# Patient Record
Sex: FEMALE | Race: WHITE | URBAN - NONMETROPOLITAN AREA
[De-identification: names, ages, dates, MRNs, and addresses within clinical notes are randomized per-mention and may not be internally consistent; named-entity substitution may affect disease eponyms.]

---

## 2021-09-08 DIAGNOSIS — L90.0 LICHEN SCLEROSUS: ICD-10-CM

## 2021-09-08 DIAGNOSIS — L30.9 ACUTE DERMATITIS: ICD-10-CM

## 2021-09-08 DIAGNOSIS — E78.2 MIXED HYPERLIPIDEMIA: ICD-10-CM

## 2021-09-08 DIAGNOSIS — K76.9 LIVER LESION: ICD-10-CM

## 2021-09-08 DIAGNOSIS — Z99.89 OSA ON CPAP: ICD-10-CM

## 2021-09-08 DIAGNOSIS — I95.1 ORTHOSTATIC HYPOTENSION: ICD-10-CM

## 2021-09-08 DIAGNOSIS — E03.8 OTHER SPECIFIED HYPOTHYROIDISM: ICD-10-CM

## 2021-09-08 DIAGNOSIS — D50.8 OTHER IRON DEFICIENCY ANEMIA: ICD-10-CM

## 2021-09-08 DIAGNOSIS — I48.20 CHRONIC A-FIB (HCC): ICD-10-CM

## 2021-09-08 DIAGNOSIS — R39.15 URINARY URGENCY: ICD-10-CM

## 2021-09-08 DIAGNOSIS — G47.33 OSA ON CPAP: ICD-10-CM

## 2021-09-08 DIAGNOSIS — F32.89 OTHER DEPRESSION: ICD-10-CM

## 2021-09-08 DIAGNOSIS — M54.2 CHRONIC NECK PAIN: ICD-10-CM

## 2021-09-08 DIAGNOSIS — K59.09 CHRONIC CONSTIPATION: ICD-10-CM

## 2021-09-08 DIAGNOSIS — R35.1 NOCTURIA: ICD-10-CM

## 2021-09-08 DIAGNOSIS — E78.00 HYPERCHOLESTEROLEMIA: ICD-10-CM

## 2021-09-08 DIAGNOSIS — R35.0 URINARY FREQUENCY: ICD-10-CM

## 2021-09-08 DIAGNOSIS — N39.41 URGE INCONTINENCE OF URINE: ICD-10-CM

## 2021-09-08 DIAGNOSIS — E55.9 VITAMIN D DEFICIENCY: ICD-10-CM

## 2021-09-08 DIAGNOSIS — I50.9 OTHER CONGESTIVE HEART FAILURE (HCC): ICD-10-CM

## 2021-09-08 DIAGNOSIS — G89.29 CHRONIC NECK PAIN: ICD-10-CM

## 2021-09-08 DIAGNOSIS — I10 ESSENTIAL HYPERTENSION: ICD-10-CM

## 2021-09-21 VITALS
DIASTOLIC BLOOD PRESSURE: 68 MMHG | BODY MASS INDEX: 25.42 KG/M2 | SYSTOLIC BLOOD PRESSURE: 130 MMHG | WEIGHT: 152.56 LBS | HEART RATE: 52 BPM | HEIGHT: 65 IN | OXYGEN SATURATION: 97 % | TEMPERATURE: 97.7 F

## 2021-09-21 PROBLEM — Z78.0 POSTMENOPAUSE: Status: ACTIVE | Noted: 2021-09-21

## 2021-09-21 PROBLEM — N18.30 STAGE 3 CHRONIC KIDNEY DISEASE (HCC): Status: ACTIVE | Noted: 2021-09-21

## 2021-09-21 PROBLEM — I82.409 DEEP VEIN THROMBOSIS (DVT) (HCC): Status: ACTIVE | Noted: 2021-09-21

## 2021-09-21 RX ORDER — DULOXETIN HYDROCHLORIDE 60 MG/1
60 CAPSULE, DELAYED RELEASE ORAL
COMMUNITY
Start: 2021-05-06

## 2021-09-21 RX ORDER — ALBUTEROL SULFATE 90 UG/1
180 AEROSOL, METERED RESPIRATORY (INHALATION) 4 TIMES DAILY
COMMUNITY
Start: 2021-04-06

## 2021-09-21 RX ORDER — LANOLIN ALCOHOL/MO/W.PET/CERES
1000 CREAM (GRAM) TOPICAL DAILY
COMMUNITY
Start: 2020-11-06

## 2021-09-21 RX ORDER — LEVOTHYROXINE SODIUM 137 UG/1
137 TABLET ORAL DAILY
COMMUNITY
Start: 2021-07-08

## 2021-09-21 RX ORDER — GABAPENTIN 300 MG/1
300 CAPSULE ORAL NIGHTLY
COMMUNITY

## 2021-09-21 RX ORDER — FERROUS SULFATE 325(65) MG
325 TABLET ORAL 2 TIMES DAILY
COMMUNITY

## 2021-09-21 RX ORDER — ATORVASTATIN CALCIUM 40 MG/1
40 TABLET, FILM COATED ORAL
COMMUNITY
Start: 2021-06-11

## 2021-09-21 RX ORDER — LORATADINE 10 MG/1
10 TABLET ORAL DAILY
COMMUNITY
Start: 2021-05-10

## 2021-09-21 RX ORDER — METOPROLOL SUCCINATE 50 MG/1
50 TABLET, EXTENDED RELEASE ORAL DAILY
COMMUNITY
Start: 2021-08-30

## 2021-09-21 RX ORDER — MULTIVIT-MIN/IRON/FOLIC ACID/K 18-600-40
500 CAPSULE ORAL DAILY
COMMUNITY
Start: 2020-11-06

## 2021-10-07 ENCOUNTER — HOSPITAL ENCOUNTER (OUTPATIENT)
Age: 71
Setting detail: SPECIMEN
Discharge: HOME | End: 2021-10-07
Payer: COMMERCIAL

## 2021-10-07 ENCOUNTER — OFFICE VISIT (OUTPATIENT)
Dept: UROLOGY | Age: 71
End: 2021-10-07
Payer: COMMERCIAL

## 2021-10-07 VITALS
OXYGEN SATURATION: 95 % | DIASTOLIC BLOOD PRESSURE: 80 MMHG | HEART RATE: 59 BPM | SYSTOLIC BLOOD PRESSURE: 122 MMHG | HEIGHT: 66 IN | BODY MASS INDEX: 24.59 KG/M2 | WEIGHT: 153 LBS

## 2021-10-07 DIAGNOSIS — K59.09 CHRONIC CONSTIPATION: ICD-10-CM

## 2021-10-07 DIAGNOSIS — N39.46 MIXED INCONTINENCE: Primary | ICD-10-CM

## 2021-10-07 DIAGNOSIS — N39.46 MIXED INCONTINENCE: ICD-10-CM

## 2021-10-07 LAB
-: ABNORMAL
AMORPHOUS: ABNORMAL
BACTERIA: ABNORMAL
BILIRUBIN URINE: NEGATIVE
CASTS UA: ABNORMAL /LPF
COLOR: ABNORMAL
COMMENT UA: ABNORMAL
CRYSTALS, UA: ABNORMAL /HPF
EPITHELIAL CELLS UA: ABNORMAL /HPF
GLUCOSE URINE: NEGATIVE
KETONES, URINE: NEGATIVE
LEUKOCYTE ESTERASE, URINE: NEGATIVE
MUCUS: ABNORMAL
NITRITE, URINE: NEGATIVE
OTHER OBSERVATIONS UA: ABNORMAL
PH UA: 7 (ref 5–8)
PROTEIN UA: ABNORMAL
RBC UA: ABNORMAL /HPF (ref 0–2)
RENAL EPITHELIAL, UA: ABNORMAL /HPF
SPECIFIC GRAVITY UA: 1 (ref 1–1.03)
TRICHOMONAS: ABNORMAL
TURBIDITY: CLEAR
URINE HGB: ABNORMAL
UROBILINOGEN, URINE: NORMAL
WBC UA: ABNORMAL /HPF
YEAST: ABNORMAL

## 2021-10-07 PROCEDURE — 99204 OFFICE O/P NEW MOD 45 MIN: CPT | Performed by: PHYSICIAN ASSISTANT

## 2021-10-07 PROCEDURE — 81001 URINALYSIS AUTO W/SCOPE: CPT

## 2021-10-07 PROCEDURE — 51798 US URINE CAPACITY MEASURE: CPT | Performed by: PHYSICIAN ASSISTANT

## 2021-10-07 PROCEDURE — 87086 URINE CULTURE/COLONY COUNT: CPT

## 2021-10-07 ASSESSMENT — ENCOUNTER SYMPTOMS
APNEA: 0
CONSTIPATION: 1
VOMITING: 0
COUGH: 0
COLOR CHANGE: 0
EYE REDNESS: 0
BACK PAIN: 0
NAUSEA: 0
WHEEZING: 0
ABDOMINAL PAIN: 0
SHORTNESS OF BREATH: 0

## 2021-10-07 NOTE — PATIENT INSTRUCTIONS
FOR CONSTIPATION    It is very important to have regular, soft, daily bowel movements, because it WILL improve your urinary symptoms. Take Miralax (or generic equivalent) 17g once daily (one capful). Take every day, DO NOT skip days, as it must be taken daily in order to be effective. DO NOT take just \"as needed\". It is safe to take long term and is recommended for your symptoms. If one dose daily causes loose stools/diarrhea, decrease to 1/2 capful or 1/4 capful. If you cannot tolerate this medication, please notify our office. If one dose daily of Miralax is not sufficient to produce a soft, easy to pass daily stool, you may also add an over-the-counter stool softener capsule. For example: colace (docusate). For Miralax to have maximal effectiveness, be sure to increase your water intake - aim for 80 oz daily unless you are on a fluid-restriction from another provider. BLADDER IRRITANTS     There are several changes you can make to your diet to help improve your urinary symptoms. The following have been shown to cause irritation to the bladder and should be AVOIDED if possible:  ~ Coffee (including decaffeinated)   ~ ALL Tea (including green teas and decaffeinated)  ~ Soda/Pop/carbonated beverages/Energy drinks (especially dark dyed sidney, root beers, mountain dew, etc)  ~These are MUCH worse if they have caffeine, but can also be irritative to the bladder even without caffeine  ~ Alcoholic beverages  ~ Spicy foods (peppers contain capsaicin, which is very irritating to the bladder)  ~ Acidic foods (for example: tomato based foods, orange juice, etc)    We encourage increased water intake, unless you have been placed on a fluid restriction by another provider. SURVEY:    You may be receiving a survey from [employer] regarding your visit today. Please complete the survey to enable us to provide the highest quality of care to you and your family.     If you cannot score us a very good on any question, please call the office to discuss how we could have made your experience a very good one. Thank you.

## 2021-10-07 NOTE — PROGRESS NOTES
HPI:    Patient is a 65 y.o. female in no acute distress. She is alert and oriented to person, place, and time. Patient presents today as a referral from DHIR[facility] Caruso for several years of incontinence, but started wearing a pad 6-7 months ago and has significantly worsened of the past 2-3 months. No history of UTIs. NO history of kidney stones. 4-5 pads a day often saturated. She states that some of her incontinence is secondary to not getting there in time. She also has incontinence when she was from sitting to standing. Nocturia 5-6. Urinates every 1 hour during the day. Drinks 2-3 decaffinated coffees per day - 1 cup in the morning and 1-2 in the afternoon, flavored logan ~32oz, and daily Decaffinated tea and/or [employer] Rootbeer (16oz). She takes fiber daily. She has a BM most days, but often they are hard. She has 5 children, all vaginal births. Hysterectomy over 20 years ago. Patient voided - 300 mL, PVR - 37mL. She was on myrbetriq in the past for about a month, but developed HTN, so it was discontinued.      Past Medical History:   Diagnosis Date    Acute dermatitis     Chronic a-fib (HCC)     Chronic constipation     Chronic neck pain     Essential hypertension     History of colon polyps     Hypercholesterolemia     Iron deficiency anemia     Lichen sclerosus     Liver lesion     Mixed hyperlipidemia     Nocturia     Orthostatic hypotension     SANDRA on CPAP     Osteopenia, unspecified location     Other congestive heart failure (HCC)     Other depression     Other specified hypothyroidism     Urinary frequency     Urinary incontinence     Urinary urgency     Vitamin D deficiency      Past Surgical History:   Procedure Laterality Date    COLONOSCOPY      HYSTERECTOMY, TOTAL ABDOMINAL      INTRACAPSULAR CATARACT EXTRACTION  11/09/2020    INTRACAPSULAR CATARACT EXTRACTION  09/28/2020    TOTAL KNEE ARTHROPLASTY Right 07/13/2015    UPPER GASTROINTESTINAL ENDOSCOPY      VARICOSE VEIN SURGERY       Outpatient Encounter Medications as of 10/7/2021   Medication Sig Dispense Refill    atorvastatin (LIPITOR) 40 MG tablet Take 40 mg by mouth      calcium carbonate 1500 (600 Ca) MG TABS tablet Take by mouth      DULoxetine (CYMBALTA) 60 MG extended release capsule Take 60 mg by mouth      levothyroxine (SYNTHROID) 137 MCG tablet Take 137 mcg by mouth daily      loratadine (CLARITIN) 10 MG tablet Take 10 mg by mouth daily      metoprolol succinate (TOPROL XL) 50 MG extended release tablet Take 50 mg by mouth daily      mirabegron (MYRBETRIQ) 25 MG TB24 Take 25 mg by mouth daily      Ascorbic Acid (VITAMIN C) 500 MG CAPS Take 500 mg by mouth daily      vitamin B-12 (CYANOCOBALAMIN) 1000 MCG tablet Take 1,000 mcg by mouth daily      Psyllium (METAMUCIL PO) Take 1,050 mg by mouth daily Take 2 525 mg capsules daily      albuterol sulfate HFA (PROAIR HFA) 108 (90 Base) MCG/ACT inhaler Inhale 180 mcg into the lungs 4 times daily      apixaban (ELIQUIS) 5 MG TABS tablet Take 5 mg by mouth 2 times daily      ferrous sulfate (IRON 325) 325 (65 Fe) MG tablet Take 325 mg by mouth 2 times daily      Fluticasone Propionate (FLONASE NA) 0.05 mg by Nasal route as needed (allergies) 1 spray each nasal as needed for allergies      gabapentin (NEURONTIN) 300 MG capsule Take 300 mg by mouth nightly.  Cholecalciferol (VITAMIN D3 PO) Take 500 Units by mouth daily       No facility-administered encounter medications on file as of 10/7/2021.       Current Outpatient Medications on File Prior to Visit   Medication Sig Dispense Refill    atorvastatin (LIPITOR) 40 MG tablet Take 40 mg by mouth      calcium carbonate 1500 (600 Ca) MG TABS tablet Take by mouth      DULoxetine (CYMBALTA) 60 MG extended release capsule Take 60 mg by mouth      levothyroxine (SYNTHROID) 137 MCG tablet Take 137 mcg by mouth daily      loratadine (CLARITIN) 10 MG tablet Take 10 mg by mouth daily      metoprolol succinate (TOPROL XL) 50 MG extended release tablet Take 50 mg by mouth daily      mirabegron (MYRBETRIQ) 25 MG TB24 Take 25 mg by mouth daily      Ascorbic Acid (VITAMIN C) 500 MG CAPS Take 500 mg by mouth daily      vitamin B-12 (CYANOCOBALAMIN) 1000 MCG tablet Take 1,000 mcg by mouth daily      Psyllium (METAMUCIL PO) Take 1,050 mg by mouth daily Take 2 525 mg capsules daily      albuterol sulfate HFA (PROAIR HFA) 108 (90 Base) MCG/ACT inhaler Inhale 180 mcg into the lungs 4 times daily      apixaban (ELIQUIS) 5 MG TABS tablet Take 5 mg by mouth 2 times daily      ferrous sulfate (IRON 325) 325 (65 Fe) MG tablet Take 325 mg by mouth 2 times daily      Fluticasone Propionate (FLONASE NA) 0.05 mg by Nasal route as needed (allergies) 1 spray each nasal as needed for allergies      gabapentin (NEURONTIN) 300 MG capsule Take 300 mg by mouth nightly.  Cholecalciferol (VITAMIN D3 PO) Take 500 Units by mouth daily       No current facility-administered medications on file prior to visit. Patient has no known allergies. Family History   Problem Relation Age of Onset   Elisha Iraheta Cancer Mother     Dementia Mother     Diabetes Father     Hypertension Brother     Heart Failure Brother     COPD Brother      Social History     Tobacco Use   Smoking Status Never Smoker   Smokeless Tobacco Never Used       Social History     Substance and Sexual Activity   Alcohol Use Never       Review of Systems   Constitutional: Negative for appetite change, chills and fever. Eyes: Negative for redness and visual disturbance. Respiratory: Negative for apnea, cough, shortness of breath and wheezing. Cardiovascular: Negative for chest pain and leg swelling. Gastrointestinal: Positive for constipation. Negative for abdominal pain, nausea and vomiting. Genitourinary: Positive for urgency.  Negative for difficulty urinating, dyspareunia, dysuria, enuresis, flank pain, frequency, hematuria, pelvic pain, vaginal bleeding and vaginal discharge. Positive for mixed incontinence   Musculoskeletal: Negative for back pain, joint swelling and myalgias. Skin: Negative for color change, rash and wound. Neurological: Negative for dizziness, tremors and numbness. Hematological: Negative for adenopathy. Does not bruise/bleed easily. Psychiatric/Behavioral: Negative for sleep disturbance. /80 (Site: Right Upper Arm, Position: Sitting, Cuff Size: Medium Adult)   Pulse 59   Ht 5' 6\" (1.676 m)   Wt 153 lb (69.4 kg)   SpO2 95%   BMI 24.69 kg/m²       PHYSICAL EXAM:  Constitutional: Patient resting comfortably, in no acute distress. Neuro: Alert and oriented to person place and time. Psych: Mood and affect normal.  HEENT: normocephalic, atraumatic  Lungs: Respiratory effort normal, unlabored  Abdomen: Soft, non-tender, non-distended  : No CVA tenderness. Pelvic: deferred     No results found for: BUN  No results found for: CREATININE    ASSESSMENT:   Diagnosis Orders   1. Mixed incontinence  SD MEASUREMENT,POST-VOID RESIDUAL VOLUME BY US,NON-IMAGING    Urinalysis with Microscopic    Culture, Urine   2. Chronic constipation         PLAN:  We will check a urinalysis and culture. We will call with results. Discussed bladder irritants thoroughly. Patient instructed to avoid/minimize intake of food/drinks such as: coffee, tea, caffeine, alcohol, carbonated beverages, dark soda/pop, spicy/acidic foods. Was sent home with a extensive list, including non-irritating alternatives. Patient does have chronic constipation. Patient is only taking fiber for this and is not having a good soft daily bowel movement daily. We did discuss with her the importance of a good soft daily bowel met and its relation to her urinary symptoms. We do recommend her starting MiraLAX daily. We did give her instructions on how to use this.     We did discuss with her that treating overactive bladder and mixed incontinence may take several different treatments in order to find the one that works best for her. We did discuss with her that there are other medications that we can try as well as Botox or InterStim device if she has failed conservative management. She may also be a good candidate for pelvic floor rehabilitation.     Follow-up in 2 to 3 months with PVR

## 2021-10-08 LAB
CULTURE: NO GROWTH
Lab: NORMAL
SPECIMEN DESCRIPTION: NORMAL

## 2021-10-11 ENCOUNTER — TELEPHONE (OUTPATIENT)
Dept: UROLOGY | Age: 71
End: 2021-10-11

## 2021-10-11 NOTE — TELEPHONE ENCOUNTER
----- Message from Michelle Morris PA-C sent at 10/11/2021 12:28 PM EDT -----  Please call pt - urine culture reviewed and does not show UTI

## 2023-12-02 DIAGNOSIS — I48.11 LONGSTANDING PERSISTENT ATRIAL FIBRILLATION (MULTI): Primary | ICD-10-CM

## 2023-12-05 RX ORDER — WARFARIN 4 MG/1
4 TABLET ORAL
Qty: 30 TABLET | Refills: 0 | Status: SHIPPED | OUTPATIENT
Start: 2023-12-05

## 2024-01-11 PROBLEM — I27.20 PULMONARY HYPERTENSION (MULTI): Status: ACTIVE | Noted: 2024-01-11

## 2024-01-11 PROBLEM — I10 ESSENTIAL HYPERTENSION: Status: ACTIVE | Noted: 2024-01-11

## 2024-01-11 PROBLEM — G47.30 SLEEP APNEA: Status: ACTIVE | Noted: 2024-01-11

## 2024-01-11 PROBLEM — I48.20 CHRONIC ATRIAL FIBRILLATION (MULTI): Status: ACTIVE | Noted: 2024-01-11

## 2024-01-11 PROBLEM — E78.5 HYPERLIPIDEMIA: Status: ACTIVE | Noted: 2024-01-11

## 2024-01-11 PROBLEM — E11.9 DIABETES MELLITUS (MULTI): Status: ACTIVE | Noted: 2024-01-11

## 2024-01-11 PROBLEM — N18.32 STAGE 3B CHRONIC KIDNEY DISEASE (MULTI): Status: ACTIVE | Noted: 2024-01-11

## 2024-01-11 PROBLEM — R55 SYNCOPE: Status: ACTIVE | Noted: 2024-01-11

## 2024-01-11 PROBLEM — E03.9 HYPOTHYROIDISM: Status: ACTIVE | Noted: 2024-01-11

## 2024-01-11 PROBLEM — I50.810 RIGHT HEART FAILURE (MULTI): Status: ACTIVE | Noted: 2024-01-11

## 2024-01-11 RX ORDER — CYCLOSPORINE 0.5 MG/ML
EMULSION OPHTHALMIC 2 TIMES DAILY
COMMUNITY

## 2024-01-11 RX ORDER — MELOXICAM 15 MG/1
15 TABLET ORAL DAILY
COMMUNITY
Start: 2023-01-26 | End: 2024-02-07

## 2024-01-11 RX ORDER — FUROSEMIDE 40 MG/1
TABLET ORAL
COMMUNITY

## 2024-01-11 RX ORDER — BIOTIN 5 MG
1 TABLET ORAL DAILY
COMMUNITY

## 2024-01-11 RX ORDER — ATORVASTATIN CALCIUM 40 MG/1
40 TABLET, FILM COATED ORAL NIGHTLY
COMMUNITY

## 2024-01-11 RX ORDER — LEVOTHYROXINE SODIUM 137 UG/1
137 TABLET ORAL DAILY
COMMUNITY

## 2024-01-11 RX ORDER — FLUTICASONE PROPIONATE 50 MCG
1 SPRAY, SUSPENSION (ML) NASAL 2 TIMES DAILY
COMMUNITY

## 2024-01-11 RX ORDER — GLUCOSAM/CHONDRO/HERB 149/HYAL 750-100 MG
1 TABLET ORAL DAILY
COMMUNITY

## 2024-01-11 RX ORDER — CHOLECALCIFEROL (VITAMIN D3) 125 MCG
1 CAPSULE ORAL DAILY
COMMUNITY

## 2024-01-11 RX ORDER — CARBOXYMETHYLCELLULOSE SODIUM 5 MG/ML
SOLUTION/ DROPS OPHTHALMIC
COMMUNITY

## 2024-01-11 RX ORDER — ALBUTEROL SULFATE 90 UG/1
2 AEROSOL, METERED RESPIRATORY (INHALATION) EVERY 4 HOURS PRN
COMMUNITY

## 2024-01-11 RX ORDER — DULOXETIN HYDROCHLORIDE 60 MG/1
60 CAPSULE, DELAYED RELEASE ORAL 2 TIMES DAILY
COMMUNITY
End: 2024-02-07

## 2024-01-11 RX ORDER — FOLIC ACID 0.4 MG
1 TABLET ORAL DAILY
COMMUNITY

## 2024-01-11 RX ORDER — FERROUS SULFATE 325(65) MG
1 TABLET ORAL DAILY
COMMUNITY

## 2024-01-11 RX ORDER — GABAPENTIN 300 MG/1
300 CAPSULE ORAL NIGHTLY
COMMUNITY

## 2024-01-11 RX ORDER — IBUPROFEN 100 MG/5ML
1 SUSPENSION, ORAL (FINAL DOSE FORM) ORAL DAILY
COMMUNITY

## 2024-01-11 RX ORDER — METOPROLOL SUCCINATE 50 MG/1
1 TABLET, EXTENDED RELEASE ORAL DAILY
COMMUNITY
Start: 2022-01-03 | End: 2024-02-07 | Stop reason: DRUGHIGH

## 2024-02-07 ENCOUNTER — OFFICE VISIT (OUTPATIENT)
Dept: CARDIOLOGY | Facility: CLINIC | Age: 74
End: 2024-02-07
Payer: COMMERCIAL

## 2024-02-07 VITALS
SYSTOLIC BLOOD PRESSURE: 118 MMHG | BODY MASS INDEX: 25.66 KG/M2 | DIASTOLIC BLOOD PRESSURE: 62 MMHG | HEART RATE: 60 BPM | WEIGHT: 154 LBS | HEIGHT: 65 IN

## 2024-02-07 DIAGNOSIS — E78.2 MIXED HYPERLIPIDEMIA: ICD-10-CM

## 2024-02-07 DIAGNOSIS — G47.33 OBSTRUCTIVE SLEEP APNEA SYNDROME: ICD-10-CM

## 2024-02-07 DIAGNOSIS — R55 SYNCOPE, UNSPECIFIED SYNCOPE TYPE: ICD-10-CM

## 2024-02-07 DIAGNOSIS — I27.20 PULMONARY HYPERTENSION (MULTI): ICD-10-CM

## 2024-02-07 DIAGNOSIS — I48.20 CHRONIC ATRIAL FIBRILLATION (MULTI): Primary | ICD-10-CM

## 2024-02-07 DIAGNOSIS — I10 ESSENTIAL HYPERTENSION: ICD-10-CM

## 2024-02-07 DIAGNOSIS — N18.32 STAGE 3B CHRONIC KIDNEY DISEASE (MULTI): ICD-10-CM

## 2024-02-07 PROCEDURE — 93000 ELECTROCARDIOGRAM COMPLETE: CPT | Performed by: INTERNAL MEDICINE

## 2024-02-07 PROCEDURE — 1036F TOBACCO NON-USER: CPT | Performed by: INTERNAL MEDICINE

## 2024-02-07 PROCEDURE — 1160F RVW MEDS BY RX/DR IN RCRD: CPT | Performed by: INTERNAL MEDICINE

## 2024-02-07 PROCEDURE — 3074F SYST BP LT 130 MM HG: CPT | Performed by: INTERNAL MEDICINE

## 2024-02-07 PROCEDURE — 99214 OFFICE O/P EST MOD 30 MIN: CPT | Performed by: INTERNAL MEDICINE

## 2024-02-07 PROCEDURE — 3078F DIAST BP <80 MM HG: CPT | Performed by: INTERNAL MEDICINE

## 2024-02-07 PROCEDURE — 1159F MED LIST DOCD IN RCRD: CPT | Performed by: INTERNAL MEDICINE

## 2024-02-07 RX ORDER — METOPROLOL SUCCINATE 25 MG/1
25 TABLET, EXTENDED RELEASE ORAL DAILY
COMMUNITY

## 2024-02-07 RX ORDER — ONDANSETRON 4 MG/1
4 TABLET, FILM COATED ORAL EVERY 8 HOURS PRN
COMMUNITY

## 2024-02-07 RX ORDER — VENLAFAXINE 75 MG/1
75 TABLET ORAL 2 TIMES DAILY
COMMUNITY

## 2024-02-07 NOTE — LETTER
February 7, 2024     Merced Huerta, APRN-CNP  [address]  [place] [address]    Patient: Rosie Ruby   YOB: 1950   Date of Visit: 2/7/2024       Dear Dr. Merced Huerta, APRN-CNP:    Thank you for referring Rosie Ruby to me for evaluation. Below are my notes for this consultation.  If you have questions, please do not hesitate to call me. I look forward to following your patient along with you.       Sincerely,     Kelley Coates MD      CC: No Recipients  ______________________________________________________________________________________    Subjective   Rosie Ruby is a 73 y.o. female       Chief Complaint    Follow-up          HPI          Patient is here for follow-up continue management for chronic permanent atrial fibrillation, long-term anticoagulation, hypertension and previous history of syncope.  Since last time I saw her she reports she developed cholecystitis and underwent cholecystectomy.  Her surgery apparently was complicated by changes in her heart rate and blood pressure requiring prolonged hospitalization.  But ultimately she was discharged on reduced dose of metoprolol and has done well since then.  She denies complaint of chest pain, palpitation, lightheadedness, dizziness or syncope.  Her EKG showed atrial fibrillation with controlled rate.    Assessment     1. Chronic atrial fibrillation. Heart rate controlled and on long-term anticoagulation   2. Prior evaluation for 2 episodes of loss of consciousness. Work-up was negative including event monitor.  She had no recurrence  3. Long-term anticoagulation recently requested switch to Coumadin. She was referred to Coumadin clinic. Risk, benefit and alternative Coumadin therapy discussed with her she understood and agreed  4. History of morbid obesity with massive weight loss intentionally with BMI dropping from 43 to around 25  5. Remote history of gastritis and gastrointestinal bleed. No  "recurrence.  6. Hypertension, controlled  7. Previous history of right-sided heart failure appears to be compensated  8. Mild chronic kidney disease. Creatinine is 1 and BUN is 25  9. Secondary pulmonary hypertension documented remotely no recent evaluation  10. Hyperlipidemia controlled. Lipid done back in May showed LDL of 62        Plan     1. Continue with strategy of heart rate control on long-term anticoagulation and event monitor showed adequately controlled heart rate  2. I reviewed with the patient her recent lab work  3.. Patient will have follow-up in 9 months with   4., Benefits alternative anticoagulation reviewed with patient at length she understood and agreed      Review of Systems   All other systems reviewed and are negative.           Visit Vitals  /62 (BP Location: Left arm, Patient Position: Sitting)   Pulse 60   Ht 1.651 m (5' 5\")   Wt 69.9 kg (154 lb)   BMI 25.63 kg/m²   Smoking Status Never   BSA 1.79 m²      EKG done in office today   Objective   Physical Exam  Constitutional:       Appearance: Normal appearance. She is normal weight.   HENT:      Nose: Nose normal.   Neck:      Vascular: No carotid bruit.   Cardiovascular:      Rate and Rhythm: Normal rate. Rhythm regularly irregular.      Pulses: Normal pulses.      Heart sounds: Normal heart sounds.   Pulmonary:      Effort: Pulmonary effort is normal.   Abdominal:      General: Bowel sounds are normal.      Palpations: Abdomen is soft.   Genitourinary:     Rectum: Normal.   Musculoskeletal:         General: Normal range of motion.      Cervical back: Normal range of motion.      Right lower leg: No edema.      Left lower leg: No edema.   Skin:     General: Skin is warm and dry.   Neurological:      General: No focal deficit present.      Mental Status: She is alert.   Psychiatric:         Mood and Affect: Mood normal.         Behavior: Behavior normal.         Thought Content: Thought content normal.         Judgment: Judgment " normal.         Current Medications    Current Outpatient Medications:   •  albuterol 90 mcg/actuation inhaler, Inhale 2 puffs every 4 hours if needed., Disp: , Rfl:   •  ascorbic acid (Vitamin C) 1,000 mg tablet, Take 1 tablet (1,000 mg) by mouth once daily., Disp: , Rfl:   •  atorvastatin (Lipitor) 40 mg tablet, Take 1 tablet (40 mg) by mouth once daily at bedtime., Disp: , Rfl:   •  biotin 5 mg tablet, Take 1 tablet (5 mg) by mouth once daily., Disp: , Rfl:   •  CALCIUM CITRATE-VITAMIN D3 ORAL, Take 1 tablet by mouth once daily., Disp: , Rfl:   •  carboxymethylcellulose (Refresh Plus) 0.5 % ophthalmic solution, Administer into affected eye(s)., Disp: , Rfl:   •  cholecalciferol (Vitamin D-3) 125 MCG (5000 UT) capsule, Take 1 capsule (125 mcg) by mouth once daily., Disp: , Rfl:   •  cycloSPORINE (Restasis) 0.05 % ophthalmic emulsion, Administer into affected eye(s) twice a day., Disp: , Rfl:   •  ferrous sulfate, 325 mg ferrous sulfate, tablet, Take 1 tablet by mouth once daily., Disp: , Rfl:   •  fluticasone (Flonase) 50 mcg/actuation nasal spray, Administer 1 spray into each nostril 2 times a day., Disp: , Rfl:   •  folic acid (Folvite) 400 mcg tablet, Take 1 tablet (0.4 mg) by mouth once daily., Disp: , Rfl:   •  furosemide (Lasix) 40 mg tablet, TAKE 1 TABLET BY MOUTH ONCE DAILY AS NEEDED (VISIBLE WATER RETENTION), Disp: , Rfl:   •  gabapentin (Neurontin) 300 mg capsule, Take 1 capsule (300 mg) by mouth once daily at bedtime., Disp: , Rfl:   •  levothyroxine (Synthroid, Levoxyl) 137 mcg tablet, Take 1 tablet (137 mcg) by mouth once daily., Disp: , Rfl:   •  metoprolol succinate XL (Toprol-XL) 25 mg 24 hr tablet, Take 1 tablet (25 mg) by mouth once daily. Do not crush or chew., Disp: , Rfl:   •  omega 3-dha-epa-fish oil (Fish OiL) 1,000 mg (120 mg-180 mg) capsule, Take 1 capsule (1,000 mg) by mouth once daily., Disp: , Rfl:   •  ondansetron (Zofran) 4 mg tablet, Take 1 tablet (4 mg) by mouth every 8 hours if  needed for nausea or vomiting., Disp: , Rfl:   •  venlafaxine (Effexor) 75 mg tablet, Take 1 tablet (75 mg) by mouth 2 times a day., Disp: , Rfl:   •  warfarin (Coumadin) 4 mg tablet, Take 1 tablet by mouth once daily, Disp: 30 tablet, Rfl: 0                     Assessment/Plan   1. Chronic atrial fibrillation (CMS/HCC)  Follow Up In Cardiology    ECG 12 Lead      2. Essential hypertension  Follow Up In Cardiology      3. Mixed hyperlipidemia        4. Pulmonary hypertension (CMS/HCC)        5. Stage 3b chronic kidney disease (CMS/HCC)        6. Obstructive sleep apnea syndrome        7. Syncope, unspecified syncope type           Scribe Attestation  By signing my name below, I [email or web address] , Scribkatarina   attest that this documentation has been prepared under the direction and in the presence of Kelley Coates MD.

## 2024-02-07 NOTE — PROGRESS NOTES
Subjective   Rosie Ruby is a 73 y.o. female       Chief Complaint    Follow-up          HPI          Patient is here for follow-up continue management for chronic permanent atrial fibrillation, long-term anticoagulation, hypertension and previous history of syncope.  Since last time I saw her she reports she developed cholecystitis and underwent cholecystectomy.  Her surgery apparently was complicated by changes in her heart rate and blood pressure requiring prolonged hospitalization.  But ultimately she was discharged on reduced dose of metoprolol and has done well since then.  She denies complaint of chest pain, palpitation, lightheadedness, dizziness or syncope.  Her EKG showed atrial fibrillation with controlled rate.    Assessment     1. Chronic atrial fibrillation. Heart rate controlled and on long-term anticoagulation   2. Prior evaluation for 2 episodes of loss of consciousness. Work-up was negative including event monitor.  She had no recurrence  3. Long-term anticoagulation recently requested switch to Coumadin. She was referred to Coumadin clinic. Risk, benefit and alternative Coumadin therapy discussed with her she understood and agreed  4. History of morbid obesity with massive weight loss intentionally with BMI dropping from 43 to around 25  5. Remote history of gastritis and gastrointestinal bleed. No recurrence.  6. Hypertension, controlled  7. Previous history of right-sided heart failure appears to be compensated  8. Mild chronic kidney disease. Creatinine is 1 and BUN is 25  9. Secondary pulmonary hypertension documented remotely no recent evaluation  10. Hyperlipidemia controlled. Lipid done back in May showed LDL of 62        Plan     1. Continue with strategy of heart rate control on long-term anticoagulation and event monitor showed adequately controlled heart rate  2. I reviewed with the patient her recent lab work  3.. Patient will have follow-up in 9 months with   4., Benefits alternative  "anticoagulation reviewed with patient at length she understood and agreed      Review of Systems   All other systems reviewed and are negative.           Visit Vitals  /62 (BP Location: Left arm, Patient Position: Sitting)   Pulse 60   Ht 1.651 m (5' 5\")   Wt 69.9 kg (154 lb)   BMI 25.63 kg/m²   Smoking Status Never   BSA 1.79 m²      EKG done in office today   Objective   Physical Exam  Constitutional:       Appearance: Normal appearance. She is normal weight.   HENT:      Nose: Nose normal.   Neck:      Vascular: No carotid bruit.   Cardiovascular:      Rate and Rhythm: Normal rate. Rhythm regularly irregular.      Pulses: Normal pulses.      Heart sounds: Normal heart sounds.   Pulmonary:      Effort: Pulmonary effort is normal.   Abdominal:      General: Bowel sounds are normal.      Palpations: Abdomen is soft.   Genitourinary:     Rectum: Normal.   Musculoskeletal:         General: Normal range of motion.      Cervical back: Normal range of motion.      Right lower leg: No edema.      Left lower leg: No edema.   Skin:     General: Skin is warm and dry.   Neurological:      General: No focal deficit present.      Mental Status: She is alert.   Psychiatric:         Mood and Affect: Mood normal.         Behavior: Behavior normal.         Thought Content: Thought content normal.         Judgment: Judgment normal.         Current Medications    Current Outpatient Medications:     albuterol 90 mcg/actuation inhaler, Inhale 2 puffs every 4 hours if needed., Disp: , Rfl:     ascorbic acid (Vitamin C) 1,000 mg tablet, Take 1 tablet (1,000 mg) by mouth once daily., Disp: , Rfl:     atorvastatin (Lipitor) 40 mg tablet, Take 1 tablet (40 mg) by mouth once daily at bedtime., Disp: , Rfl:     biotin 5 mg tablet, Take 1 tablet (5 mg) by mouth once daily., Disp: , Rfl:     CALCIUM CITRATE-VITAMIN D3 ORAL, Take 1 tablet by mouth once daily., Disp: , Rfl:     carboxymethylcellulose (Refresh Plus) 0.5 % ophthalmic " solution, Administer into affected eye(s)., Disp: , Rfl:     cholecalciferol (Vitamin D-3) 125 MCG (5000 UT) capsule, Take 1 capsule (125 mcg) by mouth once daily., Disp: , Rfl:     cycloSPORINE (Restasis) 0.05 % ophthalmic emulsion, Administer into affected eye(s) twice a day., Disp: , Rfl:     ferrous sulfate, 325 mg ferrous sulfate, tablet, Take 1 tablet by mouth once daily., Disp: , Rfl:     fluticasone (Flonase) 50 mcg/actuation nasal spray, Administer 1 spray into each nostril 2 times a day., Disp: , Rfl:     folic acid (Folvite) 400 mcg tablet, Take 1 tablet (0.4 mg) by mouth once daily., Disp: , Rfl:     furosemide (Lasix) 40 mg tablet, TAKE 1 TABLET BY MOUTH ONCE DAILY AS NEEDED (VISIBLE WATER RETENTION), Disp: , Rfl:     gabapentin (Neurontin) 300 mg capsule, Take 1 capsule (300 mg) by mouth once daily at bedtime., Disp: , Rfl:     levothyroxine (Synthroid, Levoxyl) 137 mcg tablet, Take 1 tablet (137 mcg) by mouth once daily., Disp: , Rfl:     metoprolol succinate XL (Toprol-XL) 25 mg 24 hr tablet, Take 1 tablet (25 mg) by mouth once daily. Do not crush or chew., Disp: , Rfl:     omega 3-dha-epa-fish oil (Fish OiL) 1,000 mg (120 mg-180 mg) capsule, Take 1 capsule (1,000 mg) by mouth once daily., Disp: , Rfl:     ondansetron (Zofran) 4 mg tablet, Take 1 tablet (4 mg) by mouth every 8 hours if needed for nausea or vomiting., Disp: , Rfl:     venlafaxine (Effexor) 75 mg tablet, Take 1 tablet (75 mg) by mouth 2 times a day., Disp: , Rfl:     warfarin (Coumadin) 4 mg tablet, Take 1 tablet by mouth once daily, Disp: 30 tablet, Rfl: 0                     Assessment/Plan   1. Chronic atrial fibrillation (CMS/HCC)  Follow Up In Cardiology    ECG 12 Lead      2. Essential hypertension  Follow Up In Cardiology      3. Mixed hyperlipidemia        4. Pulmonary hypertension (CMS/HCC)        5. Stage 3b chronic kidney disease (CMS/HCC)        6. Obstructive sleep apnea syndrome        7. Syncope, unspecified syncope type            Scribe Attestation  By signing my name below, I [email or web address] , Scribkatarina   attest that this documentation has been prepared under the direction and in the presence of Kelley Coates MD.

## 2024-07-09 DIAGNOSIS — I10 ESSENTIAL HYPERTENSION: ICD-10-CM

## 2024-07-09 NOTE — TELEPHONE ENCOUNTER
"Patient phoned with complaints of edema bilaterally. Not having relief from 40mg of Lasix as prescribed. Swelling started about three weeks ago. Denies SOB, but has had a 10 pound weight gain within three weeks. Recent fall three weeks ago, fracturing her \"a couple of ribs\".   Advised patient that if she continues to gain any more weight, or develops SOB to report to the ED.     To Dr. Kelley Coates MD for review.  "

## 2024-07-11 RX ORDER — SPIRONOLACTONE 25 MG/1
25 TABLET ORAL DAILY
Qty: 90 TABLET | Refills: 3 | Status: SHIPPED | OUTPATIENT
Start: 2024-07-11 | End: 2025-07-11

## 2024-07-11 NOTE — TELEPHONE ENCOUNTER
Phoned patient and discussed the above. Verbalized understanding. Will fax lab slip when signed to [facility].

## 2024-11-12 ENCOUNTER — APPOINTMENT (OUTPATIENT)
Dept: CARDIOLOGY | Facility: CLINIC | Age: 74
End: 2024-11-12
Payer: COMMERCIAL

## 2024-11-12 VITALS
SYSTOLIC BLOOD PRESSURE: 122 MMHG | HEART RATE: 72 BPM | DIASTOLIC BLOOD PRESSURE: 62 MMHG | BODY MASS INDEX: 27.82 KG/M2 | HEIGHT: 65 IN | WEIGHT: 167 LBS

## 2024-11-12 DIAGNOSIS — E78.2 MIXED HYPERLIPIDEMIA: ICD-10-CM

## 2024-11-12 DIAGNOSIS — I27.20 PULMONARY HYPERTENSION (MULTI): Primary | ICD-10-CM

## 2024-11-12 DIAGNOSIS — G47.33 OBSTRUCTIVE SLEEP APNEA SYNDROME: ICD-10-CM

## 2024-11-12 DIAGNOSIS — I48.20 CHRONIC ATRIAL FIBRILLATION (MULTI): ICD-10-CM

## 2024-11-12 DIAGNOSIS — I10 ESSENTIAL HYPERTENSION: ICD-10-CM

## 2024-11-12 DIAGNOSIS — N18.32 STAGE 3B CHRONIC KIDNEY DISEASE (MULTI): ICD-10-CM

## 2024-11-12 PROCEDURE — G2211 COMPLEX E/M VISIT ADD ON: HCPCS | Performed by: INTERNAL MEDICINE

## 2024-11-12 PROCEDURE — 1036F TOBACCO NON-USER: CPT | Performed by: INTERNAL MEDICINE

## 2024-11-12 PROCEDURE — 1159F MED LIST DOCD IN RCRD: CPT | Performed by: INTERNAL MEDICINE

## 2024-11-12 PROCEDURE — 1160F RVW MEDS BY RX/DR IN RCRD: CPT | Performed by: INTERNAL MEDICINE

## 2024-11-12 PROCEDURE — 3078F DIAST BP <80 MM HG: CPT | Performed by: INTERNAL MEDICINE

## 2024-11-12 PROCEDURE — 3074F SYST BP LT 130 MM HG: CPT | Performed by: INTERNAL MEDICINE

## 2024-11-12 PROCEDURE — 99214 OFFICE O/P EST MOD 30 MIN: CPT | Performed by: INTERNAL MEDICINE

## 2024-11-12 NOTE — PATIENT INSTRUCTIONS
Please bring all medicines, vitamins, and herbal supplements with you when you come to the office.    Prescriptions will not be filled unless you are compliant with your follow up appointments or have a follow up appointment scheduled as per instruction of your physician. Refills should be requested at the time of your visit.     BMI was above normal measurement. Current weight: 75.8 kg (167 lb)  Weight change since last visit (-) denotes wt loss 13 lbs   Weight loss needed to achieve BMI 25: 17.1 Lbs  Weight loss needed to achieve BMI 30: -12.9 Lbs  Provided instructions on dietary changes  Provided instructions on exercise.    9 months with EKG  lipid

## 2024-11-12 NOTE — PROGRESS NOTES
Subjective   Rosie Ruby is a 73 y.o. female       Chief Complaint    Follow-up          HPI        Patient is here for follow-up and management for chronic atrial fibrillation, previous evaluation for syncope, long-term anticoagulation.  Since last time I saw her she denies any change in cardiac status or symptoms.  She remains active.  She denies chest pain, palpitation, lightheadedness, dizziness or syncope.  Her recent laboratory data noted and reviewed with her.    Assessment     1. Chronic atrial fibrillation. Heart rate is well controlled and on long-term anticoagulation with Coumadin  2. Prior evaluation for 2 episodes of loss of consciousness. Work-up was negative including event monitor.  She had no recurrence over the last 6-month  3. Long-term anticoagulation on Coumadin for cost issues doing well with that  4. History of morbid obesity with massive weight loss intentionally with BMI dropping from 43 to around 27  5. Remote history of gastritis and gastrointestinal bleed. No recurrence.  6. Hypertension, controlled  7. Previous history of right-sided heart failure appears to be compensated  8. Mild chronic kidney disease. Creatinine is 1 and BUN is 25  9. Secondary pulmonary hypertension last echo 2 years ago showed mild pulmonary hypertension  10. Hyperlipidemia controlled.  No recent lipid profile is available        Plan     1. Continue with strategy of heart rate control on long-term anticoagulation and her previous event monitor showed adequately controlled heart rate  2. I reviewed with the patient her recent lab work but no lipid profile is available  3.. Patient will have follow-up in 9 months with an EKG  4., Benefits alternative anticoagulation reviewed with patient at length she understood and agreed  5.  I advised her to repeat her lipid profile  Review of Systems   All other systems reviewed and are negative.           Vitals:    11/12/24 0948   BP: 122/62   BP Location: Left arm   Patient  "Position: Sitting   Pulse: 72   Weight: 75.8 kg (167 lb)   Height: 1.651 m (5' 5\")        Objective   Physical Exam  Constitutional:       Appearance: Normal appearance.   HENT:      Nose: Nose normal.   Neck:      Vascular: No carotid bruit.   Cardiovascular:      Rate and Rhythm: Normal rate.      Pulses: Normal pulses.      Heart sounds: Normal heart sounds.   Pulmonary:      Effort: Pulmonary effort is normal.   Abdominal:      General: Bowel sounds are normal.      Palpations: Abdomen is soft.   Musculoskeletal:         General: Normal range of motion.      Cervical back: Normal range of motion.      Right lower leg: No edema.      Left lower leg: No edema.   Skin:     General: Skin is warm and dry.   Neurological:      General: No focal deficit present.      Mental Status: She is alert.   Psychiatric:         Mood and Affect: Mood normal.         Behavior: Behavior normal.         Thought Content: Thought content normal.         Judgment: Judgment normal.         Allergies  Patient has no known allergies.     Current Medications    Current Outpatient Medications:     albuterol 90 mcg/actuation inhaler, Inhale 2 puffs every 4 hours if needed., Disp: , Rfl:     ascorbic acid (Vitamin C) 1,000 mg tablet, Take 1 tablet (1,000 mg) by mouth once daily., Disp: , Rfl:     atorvastatin (Lipitor) 40 mg tablet, Take 1 tablet (40 mg) by mouth once daily at bedtime., Disp: , Rfl:     biotin 5 mg tablet, Take 1 tablet (5 mg) by mouth once daily., Disp: , Rfl:     CALCIUM CITRATE-VITAMIN D3 ORAL, Take 1 tablet by mouth once daily., Disp: , Rfl:     carboxymethylcellulose (Refresh Plus) 0.5 % ophthalmic solution, Administer into affected eye(s)., Disp: , Rfl:     cholecalciferol (Vitamin D-3) 125 MCG (5000 UT) capsule, Take 1 capsule (125 mcg) by mouth once daily., Disp: , Rfl:     cycloSPORINE (Restasis) 0.05 % ophthalmic emulsion, Administer into affected eye(s) twice a day., Disp: , Rfl:     ferrous sulfate, 325 mg ferrous " sulfate, tablet, Take 1 tablet (325 mg) by mouth once daily., Disp: , Rfl:     fluticasone (Flonase) 50 mcg/actuation nasal spray, Administer 1 spray into each nostril 2 times a day., Disp: , Rfl:     folic acid (Folvite) 400 mcg tablet, Take 1 tablet (0.4 mg) by mouth once daily., Disp: , Rfl:     furosemide (Lasix) 40 mg tablet, TAKE 1 TABLET BY MOUTH ONCE DAILY AS NEEDED (VISIBLE WATER RETENTION), Disp: , Rfl:     gabapentin (Neurontin) 300 mg capsule, Take 1 capsule (300 mg) by mouth once daily at bedtime., Disp: , Rfl:     levothyroxine (Synthroid, Levoxyl) 137 mcg tablet, Take 1 tablet (137 mcg) by mouth once daily., Disp: , Rfl:     metoprolol succinate XL (Toprol-XL) 25 mg 24 hr tablet, Take 1 tablet (25 mg) by mouth once daily. Do not crush or chew., Disp: , Rfl:     omega 3-dha-epa-fish oil (Fish OiL) 1,000 mg (120 mg-180 mg) capsule, Take 1 capsule (1,000 mg) by mouth once daily., Disp: , Rfl:     ondansetron (Zofran) 4 mg tablet, Take 1 tablet (4 mg) by mouth every 8 hours if needed for nausea or vomiting., Disp: , Rfl:     spironolactone (Aldactone) 25 mg tablet, Take 1 tablet (25 mg) by mouth once daily., Disp: 90 tablet, Rfl: 3    venlafaxine (Effexor) 75 mg tablet, Take 1 tablet (75 mg) by mouth 2 times a day., Disp: , Rfl:     warfarin (Coumadin) 4 mg tablet, Take 1 tablet by mouth once daily (Patient taking differently: Take 1 tablet (4 mg) by mouth. Take as directed by [facility]), Disp: 30 tablet, Rfl: 0                     Assessment/Plan   1. Pulmonary hypertension (Multi)        2. Chronic atrial fibrillation (Multi)  Follow Up In Cardiology      3. Essential hypertension  Follow Up In Cardiology    Follow Up In Cardiology    Lipid Panel    Lipid Panel      4. BMI 27.0-27.9,adult        5. Stage 3b chronic kidney disease (Multi)        6. Obstructive sleep apnea syndrome        7. Mixed hyperlipidemia  Lipid Panel    Lipid Panel               Scribe Attestation  By signing my name  below, I, Tiara TIERNEY LPN, Scribe   attest that this documentation has been prepared under the direction and in the presence of Kelley Coates MD.     Provider Attestation - Scribe documentation    All medical record entries made by the Scribe were at my direction and personally dictated by me. I have reviewed the chart and agree that the record accurately reflects my personal performance of the history, physical exam, discussion and plan.

## 2024-11-12 NOTE — LETTER
November 12, 2024     Merced Huerta, APRN-CNP  [address]  [place] [address]    Patient: Rosie Ruby   YOB: 1950   Date of Visit: 11/12/2024       Dear Dr. Merced Huerta, APRN-CNP:    Thank you for referring Rosie Ruby to me for evaluation. Below are my notes for this consultation.  If you have questions, please do not hesitate to call me. I look forward to following your patient along with you.       Sincerely,     Kelley Coates MD      CC: No Recipients  ______________________________________________________________________________________    Subjective   Rosie Ruby is a 73 y.o. female       Chief Complaint    Follow-up          HPI        Patient is here for follow-up and management for chronic atrial fibrillation, previous evaluation for syncope, long-term anticoagulation.  Since last time I saw her she denies any change in cardiac status or symptoms.  She remains active.  She denies chest pain, palpitation, lightheadedness, dizziness or syncope.  Her recent laboratory data noted and reviewed with her.    Assessment     1. Chronic atrial fibrillation. Heart rate is well controlled and on long-term anticoagulation with Coumadin  2. Prior evaluation for 2 episodes of loss of consciousness. Work-up was negative including event monitor.  She had no recurrence over the last 6-month  3. Long-term anticoagulation on Coumadin for cost issues doing well with that  4. History of morbid obesity with massive weight loss intentionally with BMI dropping from 43 to around 27  5. Remote history of gastritis and gastrointestinal bleed. No recurrence.  6. Hypertension, controlled  7. Previous history of right-sided heart failure appears to be compensated  8. Mild chronic kidney disease. Creatinine is 1 and BUN is 25  9. Secondary pulmonary hypertension last echo 2 years ago showed mild pulmonary hypertension  10. Hyperlipidemia controlled.  No recent lipid profile is available    "     Plan     1. Continue with strategy of heart rate control on long-term anticoagulation and her previous event monitor showed adequately controlled heart rate  2. I reviewed with the patient her recent lab work but no lipid profile is available  3.. Patient will have follow-up in 9 months with an EKG  4., Benefits alternative anticoagulation reviewed with patient at length she understood and agreed  5.  I advised her to repeat her lipid profile  Review of Systems   All other systems reviewed and are negative.           Vitals:    11/12/24 0948   BP: 122/62   BP Location: Left arm   Patient Position: Sitting   Pulse: 72   Weight: 75.8 kg (167 lb)   Height: 1.651 m (5' 5\")        Objective   Physical Exam  Constitutional:       Appearance: Normal appearance.   HENT:      Nose: Nose normal.   Neck:      Vascular: No carotid bruit.   Cardiovascular:      Rate and Rhythm: Normal rate.      Pulses: Normal pulses.      Heart sounds: Normal heart sounds.   Pulmonary:      Effort: Pulmonary effort is normal.   Abdominal:      General: Bowel sounds are normal.      Palpations: Abdomen is soft.   Musculoskeletal:         General: Normal range of motion.      Cervical back: Normal range of motion.      Right lower leg: No edema.      Left lower leg: No edema.   Skin:     General: Skin is warm and dry.   Neurological:      General: No focal deficit present.      Mental Status: She is alert.   Psychiatric:         Mood and Affect: Mood normal.         Behavior: Behavior normal.         Thought Content: Thought content normal.         Judgment: Judgment normal.         Allergies  Patient has no known allergies.     Current Medications    Current Outpatient Medications:   •  albuterol 90 mcg/actuation inhaler, Inhale 2 puffs every 4 hours if needed., Disp: , Rfl:   •  ascorbic acid (Vitamin C) 1,000 mg tablet, Take 1 tablet (1,000 mg) by mouth once daily., Disp: , Rfl:   •  atorvastatin (Lipitor) 40 mg tablet, Take 1 tablet (40 " mg) by mouth once daily at bedtime., Disp: , Rfl:   •  biotin 5 mg tablet, Take 1 tablet (5 mg) by mouth once daily., Disp: , Rfl:   •  CALCIUM CITRATE-VITAMIN D3 ORAL, Take 1 tablet by mouth once daily., Disp: , Rfl:   •  carboxymethylcellulose (Refresh Plus) 0.5 % ophthalmic solution, Administer into affected eye(s)., Disp: , Rfl:   •  cholecalciferol (Vitamin D-3) 125 MCG (5000 UT) capsule, Take 1 capsule (125 mcg) by mouth once daily., Disp: , Rfl:   •  cycloSPORINE (Restasis) 0.05 % ophthalmic emulsion, Administer into affected eye(s) twice a day., Disp: , Rfl:   •  ferrous sulfate, 325 mg ferrous sulfate, tablet, Take 1 tablet (325 mg) by mouth once daily., Disp: , Rfl:   •  fluticasone (Flonase) 50 mcg/actuation nasal spray, Administer 1 spray into each nostril 2 times a day., Disp: , Rfl:   •  folic acid (Folvite) 400 mcg tablet, Take 1 tablet (0.4 mg) by mouth once daily., Disp: , Rfl:   •  furosemide (Lasix) 40 mg tablet, TAKE 1 TABLET BY MOUTH ONCE DAILY AS NEEDED (VISIBLE WATER RETENTION), Disp: , Rfl:   •  gabapentin (Neurontin) 300 mg capsule, Take 1 capsule (300 mg) by mouth once daily at bedtime., Disp: , Rfl:   •  levothyroxine (Synthroid, Levoxyl) 137 mcg tablet, Take 1 tablet (137 mcg) by mouth once daily., Disp: , Rfl:   •  metoprolol succinate XL (Toprol-XL) 25 mg 24 hr tablet, Take 1 tablet (25 mg) by mouth once daily. Do not crush or chew., Disp: , Rfl:   •  omega 3-dha-epa-fish oil (Fish OiL) 1,000 mg (120 mg-180 mg) capsule, Take 1 capsule (1,000 mg) by mouth once daily., Disp: , Rfl:   •  ondansetron (Zofran) 4 mg tablet, Take 1 tablet (4 mg) by mouth every 8 hours if needed for nausea or vomiting., Disp: , Rfl:   •  spironolactone (Aldactone) 25 mg tablet, Take 1 tablet (25 mg) by mouth once daily., Disp: 90 tablet, Rfl: 3  •  venlafaxine (Effexor) 75 mg tablet, Take 1 tablet (75 mg) by mouth 2 times a day., Disp: , Rfl:   •  warfarin (Coumadin) 4 mg tablet, Take 1 tablet by mouth once  daily (Patient taking differently: Take 1 tablet (4 mg) by mouth. Take as directed by [facility]), Disp: 30 tablet, Rfl: 0                     Assessment/Plan   1. Pulmonary hypertension (Multi)        2. Chronic atrial fibrillation (Multi)  Follow Up In Cardiology      3. Essential hypertension  Follow Up In Cardiology    Follow Up In Cardiology    Lipid Panel    Lipid Panel      4. BMI 27.0-27.9,adult        5. Stage 3b chronic kidney disease (Multi)        6. Obstructive sleep apnea syndrome        7. Mixed hyperlipidemia  Lipid Panel    Lipid Panel               Scribe Attestation  By signing my name below, Tiara FERRARA LPN, Scribe   attest that this documentation has been prepared under the direction and in the presence of Kelley Coates MD.     Provider Attestation - Scribe documentation    All medical record entries made by the Scribe were at my direction and personally dictated by me. I have reviewed the chart and agree that the record accurately reflects my personal performance of the history, physical exam, discussion and plan.

## 2025-08-19 ENCOUNTER — APPOINTMENT (OUTPATIENT)
Dept: CARDIOLOGY | Facility: CLINIC | Age: 75
End: 2025-08-19
Payer: COMMERCIAL